# Patient Record
Sex: FEMALE | Race: BLACK OR AFRICAN AMERICAN
[De-identification: names, ages, dates, MRNs, and addresses within clinical notes are randomized per-mention and may not be internally consistent; named-entity substitution may affect disease eponyms.]

---

## 2021-04-03 ENCOUNTER — HOSPITAL ENCOUNTER (EMERGENCY)
Dept: HOSPITAL 8 - ED | Age: 26
Discharge: HOME | End: 2021-04-03
Payer: COMMERCIAL

## 2021-04-03 VITALS — DIASTOLIC BLOOD PRESSURE: 76 MMHG | SYSTOLIC BLOOD PRESSURE: 108 MMHG

## 2021-04-03 VITALS — HEIGHT: 61 IN | WEIGHT: 104.94 LBS | BODY MASS INDEX: 19.81 KG/M2

## 2021-04-03 DIAGNOSIS — U07.1: Primary | ICD-10-CM

## 2021-04-03 DIAGNOSIS — R10.9: ICD-10-CM

## 2021-04-03 DIAGNOSIS — R51.9: ICD-10-CM

## 2021-04-03 DIAGNOSIS — R11.0: ICD-10-CM

## 2021-04-03 DIAGNOSIS — M70.10: ICD-10-CM

## 2021-04-03 LAB
ALBUMIN SERPL-MCNC: 3.6 G/DL (ref 3.4–5)
ALP SERPL-CCNC: 53 U/L (ref 45–117)
ALT SERPL-CCNC: 17 U/L (ref 12–78)
ANION GAP SERPL CALC-SCNC: 3 MMOL/L (ref 5–15)
BASOPHILS # BLD AUTO: 0 X10^3/UL (ref 0–0.1)
BASOPHILS NFR BLD AUTO: 1 % (ref 0–1)
BILIRUB SERPL-MCNC: 0.2 MG/DL (ref 0.2–1)
CALCIUM SERPL-MCNC: 8.4 MG/DL (ref 8.5–10.1)
CHLORIDE SERPL-SCNC: 112 MMOL/L (ref 98–107)
CREAT SERPL-MCNC: 0.58 MG/DL (ref 0.55–1.02)
EOSINOPHIL # BLD AUTO: 0 X10^3/UL (ref 0–0.4)
EOSINOPHIL NFR BLD AUTO: 1 % (ref 1–7)
ERYTHROCYTE [DISTWIDTH] IN BLOOD BY AUTOMATED COUNT: 13.2 % (ref 9.6–15.2)
LYMPHOCYTES # BLD AUTO: 1.5 X10^3/UL (ref 1–3.4)
LYMPHOCYTES NFR BLD AUTO: 46 % (ref 22–44)
MCH RBC QN AUTO: 31.3 PG (ref 27–34.8)
MCHC RBC AUTO-ENTMCNC: 34.3 G/DL (ref 32.4–35.8)
MD: NO
MONOCYTES # BLD AUTO: 0.3 X10^3/UL (ref 0.2–0.8)
MONOCYTES NFR BLD AUTO: 9 % (ref 2–9)
NEUTROPHILS # BLD AUTO: 1.3 X10^3/UL (ref 1.8–6.8)
NEUTROPHILS NFR BLD AUTO: 42 % (ref 42–75)
PLATELET # BLD AUTO: 217 X10^3/UL (ref 130–400)
PMV BLD AUTO: 8.5 FL (ref 7.4–10.4)
PROT SERPL-MCNC: 7.6 G/DL (ref 6.4–8.2)
RBC # BLD AUTO: 4.52 X10^6/UL (ref 3.82–5.3)

## 2021-04-03 PROCEDURE — 80053 COMPREHEN METABOLIC PANEL: CPT

## 2021-04-03 PROCEDURE — 71045 X-RAY EXAM CHEST 1 VIEW: CPT

## 2021-04-03 PROCEDURE — 84703 CHORIONIC GONADOTROPIN ASSAY: CPT

## 2021-04-03 PROCEDURE — 36415 COLL VENOUS BLD VENIPUNCTURE: CPT

## 2021-04-03 PROCEDURE — 99284 EMERGENCY DEPT VISIT MOD MDM: CPT

## 2021-04-03 PROCEDURE — U0003 INFECTIOUS AGENT DETECTION BY NUCLEIC ACID (DNA OR RNA); SEVERE ACUTE RESPIRATORY SYNDROME CORONAVIRUS 2 (SARS-COV-2) (CORONAVIRUS DISEASE [COVID-19]), AMPLIFIED PROBE TECHNIQUE, MAKING USE OF HIGH THROUGHPUT TECHNOLOGIES AS DESCRIBED BY CMS-2020-01-R: HCPCS

## 2021-04-03 PROCEDURE — 85025 COMPLETE CBC W/AUTO DIFF WBC: CPT

## 2021-09-19 ENCOUNTER — HOSPITAL ENCOUNTER (EMERGENCY)
Dept: HOSPITAL 8 - ED | Age: 26
Discharge: HOME | End: 2021-09-19
Payer: COMMERCIAL

## 2021-09-19 VITALS — WEIGHT: 98.77 LBS | BODY MASS INDEX: 19.39 KG/M2 | HEIGHT: 60 IN

## 2021-09-19 VITALS — SYSTOLIC BLOOD PRESSURE: 102 MMHG | DIASTOLIC BLOOD PRESSURE: 68 MMHG

## 2021-09-19 DIAGNOSIS — K64.4: Primary | ICD-10-CM

## 2021-09-19 PROCEDURE — 99283 EMERGENCY DEPT VISIT LOW MDM: CPT

## 2021-09-19 NOTE — NUR
PATIENT CLEARED FOR DISCHARGE. VERBALIZDED UNDERSTANDING OF SELF CARE AND 
FOLLOW UP CARE AT HOME. AMBULATORY TO DISCHARGE DESK WITHOUT COMPLICATIONS WITH 
BELONGINGS.

## 2023-07-25 ENCOUNTER — OFFICE VISIT (OUTPATIENT)
Dept: URGENT CARE | Facility: CLINIC | Age: 28
End: 2023-07-25
Payer: COMMERCIAL

## 2023-07-25 ENCOUNTER — HOSPITAL ENCOUNTER (OUTPATIENT)
Facility: MEDICAL CENTER | Age: 28
End: 2023-07-25
Attending: NURSE PRACTITIONER
Payer: COMMERCIAL

## 2023-07-25 VITALS
HEART RATE: 71 BPM | HEIGHT: 60 IN | DIASTOLIC BLOOD PRESSURE: 62 MMHG | BODY MASS INDEX: 21.6 KG/M2 | WEIGHT: 110 LBS | OXYGEN SATURATION: 98 % | SYSTOLIC BLOOD PRESSURE: 94 MMHG | TEMPERATURE: 98.3 F | RESPIRATION RATE: 16 BRPM

## 2023-07-25 DIAGNOSIS — Z11.3 SCREEN FOR STD (SEXUALLY TRANSMITTED DISEASE): ICD-10-CM

## 2023-07-25 DIAGNOSIS — Z20.2 EXPOSURE TO SYPHILIS: ICD-10-CM

## 2023-07-25 PROCEDURE — 87491 CHLMYD TRACH DNA AMP PROBE: CPT

## 2023-07-25 PROCEDURE — 3074F SYST BP LT 130 MM HG: CPT | Performed by: NURSE PRACTITIONER

## 2023-07-25 PROCEDURE — 87591 N.GONORRHOEAE DNA AMP PROB: CPT

## 2023-07-25 PROCEDURE — 3078F DIAST BP <80 MM HG: CPT | Performed by: NURSE PRACTITIONER

## 2023-07-25 PROCEDURE — 99203 OFFICE O/P NEW LOW 30 MIN: CPT | Performed by: NURSE PRACTITIONER

## 2023-07-25 RX ORDER — HYDROXYZINE PAMOATE 25 MG/1
CAPSULE ORAL
COMMUNITY
Start: 2023-06-24

## 2023-07-25 RX ORDER — CITALOPRAM 20 MG/1
20 TABLET ORAL
COMMUNITY
Start: 2023-07-14

## 2023-07-25 RX ORDER — ARIPIPRAZOLE 2 MG/1
4 TABLET ORAL
COMMUNITY
Start: 2023-07-14

## 2023-07-25 ASSESSMENT — ENCOUNTER SYMPTOMS
FEVER: 0
HEADACHES: 0

## 2023-07-26 LAB
C TRACH DNA GENITAL QL NAA+PROBE: NEGATIVE
N GONORRHOEA DNA GENITAL QL NAA+PROBE: NEGATIVE
SPECIMEN SOURCE: NORMAL

## 2023-07-26 NOTE — PROGRESS NOTES
Subjective:   Manny Proctor is a 27 y.o. female who presents for Exposure to STD      Exposure to STD  This is a new problem. The current episode started in the past 7 days (Possible exposure to syphilis). The problem occurs constantly. Pertinent negatives include no fever, headaches, rash or urinary symptoms.       Review of Systems   Constitutional:  Negative for fever.   Genitourinary:  Negative for dysuria, frequency and urgency.   Skin:  Negative for rash.   Neurological:  Negative for headaches.       Medications:    ARIPiprazole  citalopram Tabs  hydrOXYzine pamoate Caps    Allergies: Patient has no known allergies.    Problem List: Manny Proctor does not have any pertinent problems on file.    Surgical History:  No past surgical history on file.    Past Social Hx: Manny Proctor  reports that she has never smoked. She has never used smokeless tobacco. She reports current drug use. Drug: Marijuana. She reports that she does not drink alcohol.     Past Family Hx:  Manny Proctor family history includes Alcohol/Drug in her paternal grandfather; Arthritis in her mother; Cancer in her maternal grandmother; Diabetes in her paternal aunt, paternal grandmother, and paternal uncle.     Problem list, medications, and allergies reviewed by myself today in Epic.     Objective:     BP 94/62 (BP Location: Left arm, Patient Position: Sitting, BP Cuff Size: Adult)   Pulse 71   Temp 36.8 °C (98.3 °F) (Temporal)   Resp 16   Ht 1.524 m (5')   Wt 49.9 kg (110 lb)   SpO2 98%   BMI 21.48 kg/m²     Physical Exam  Constitutional:       Appearance: Normal appearance. She is not ill-appearing or toxic-appearing.   HENT:      Head: Normocephalic.      Right Ear: External ear normal.      Left Ear: External ear normal.      Nose: Nose normal.      Mouth/Throat:      Lips: Pink.   Eyes:      General: Lids are normal.   Pulmonary:      Effort: Pulmonary effort is normal. No accessory muscle usage.    Musculoskeletal:      Cervical back: Full passive range of motion without pain.   Neurological:      Mental Status: She is alert and oriented to person, place, and time.   Psychiatric:         Mood and Affect: Mood normal.         Thought Content: Thought content normal.         Assessment/Plan:     Diagnosis and associated orders:     1. Exposure to syphilis  Chlamydia/GC, PCR (Genital/Anal swab)    T.PALLIDUM AB AGATHA (SCREENING)    HIV AG/AB COMBO ASSAY SCREENING      2. Screen for STD (sexually transmitted disease)             Comments/MDM:     I personally reviewed prior external notes and prior test results pertinent to today's visit.  Patient with a possible exposure to syphilis she is requesting full STI panel.  Advised patient if syphilis comes back positive she will be directed to the health department for treatment.  She will make an account register on [employer] for results.  We will follow-up if further treatment is necessary.  Discussed management options, risks and benefits, and alternatives to treatment plan agreed upon.   Red flags discussed and indications to immediately call 911 or present to the Emergency Department.   Supportive care, differential diagnoses, and indications for immediate follow-up discussed with patient.    Patient expresses understanding and agrees to plan. Patient denies any other questions or concerns.                  Please note that this dictation was created using voice recognition software. I have made a reasonable attempt to correct obvious errors, but I expect that there are errors of grammar and possibly content that I did not discover before finalizing the note.    This note was electronically signed by Edison BECKFORD.

## 2023-09-02 ENCOUNTER — OFFICE VISIT (OUTPATIENT)
Dept: URGENT CARE | Facility: PHYSICIAN GROUP | Age: 28
End: 2023-09-02
Payer: COMMERCIAL

## 2023-09-02 ENCOUNTER — APPOINTMENT (OUTPATIENT)
Dept: RADIOLOGY | Facility: IMAGING CENTER | Age: 28
End: 2023-09-02
Attending: PHYSICIAN ASSISTANT
Payer: COMMERCIAL

## 2023-09-02 VITALS
OXYGEN SATURATION: 100 % | DIASTOLIC BLOOD PRESSURE: 82 MMHG | RESPIRATION RATE: 12 BRPM | TEMPERATURE: 98.6 F | WEIGHT: 120 LBS | BODY MASS INDEX: 22.66 KG/M2 | SYSTOLIC BLOOD PRESSURE: 118 MMHG | HEART RATE: 69 BPM | HEIGHT: 61 IN

## 2023-09-02 DIAGNOSIS — K59.00 CONSTIPATION, UNSPECIFIED CONSTIPATION TYPE: ICD-10-CM

## 2023-09-02 DIAGNOSIS — R10.13 EPIGASTRIC PAIN: ICD-10-CM

## 2023-09-02 PROCEDURE — 3074F SYST BP LT 130 MM HG: CPT | Performed by: PHYSICIAN ASSISTANT

## 2023-09-02 PROCEDURE — 99213 OFFICE O/P EST LOW 20 MIN: CPT | Performed by: PHYSICIAN ASSISTANT

## 2023-09-02 PROCEDURE — 3079F DIAST BP 80-89 MM HG: CPT | Performed by: PHYSICIAN ASSISTANT

## 2023-09-02 PROCEDURE — 74018 RADEX ABDOMEN 1 VIEW: CPT | Mod: TC | Performed by: PHYSICIAN ASSISTANT

## 2023-09-02 ASSESSMENT — ENCOUNTER SYMPTOMS
COUGH: 0
FEVER: 0
SHORTNESS OF BREATH: 0
NAUSEA: 0
VOMITING: 0
DIARRHEA: 0
DIAPHORESIS: 0
DIZZINESS: 0
ABDOMINAL PAIN: 1
WHEEZING: 0
HEADACHES: 0
CONSTIPATION: 1
CHILLS: 0

## 2023-09-02 NOTE — LETTER
[facility] [place]  [address]  [place] [id number]     September 2, 2023    Patient: Manny Proctor   YOB: 1995   Date of Visit: 9/2/2023       To Whom It May Concern:    Manny Proctor was seen and treated in our department on 9/2/2023.  Please excuse from work on 9/1/2023 and 9/2/2023    Sincerely,     Mitul Deluna P.A.-C.

## 2023-09-02 NOTE — PROGRESS NOTES
"  Subjective:     Manny Proctor  is a 27 y.o. female who presents for Constipation (Pain in sternum and constipated )       She presents today with epigastric pain has been ongoing for the last 2 weeks.  Denies any recent dietary changes.  Denies any excessive alcohol intake, no anti-inflammatory use.  No nausea or vomiting.  She does note constipation that has been ongoing over the past several months, she has been using laxative medications without longstanding relief.  She denies any fevers, no chest pain or shortness of breath.  She has been experiencing blood in her stools but this blood is bright red and typically on the toilet paper, does have history of hemorrhoids.     Review of Systems   Constitutional:  Negative for chills, diaphoresis, fever and malaise/fatigue.   Respiratory:  Negative for cough, shortness of breath and wheezing.    Cardiovascular:  Negative for chest pain.   Gastrointestinal:  Positive for abdominal pain and constipation. Negative for diarrhea, nausea and vomiting.   Genitourinary:  Negative for dysuria, frequency and urgency.   Neurological:  Negative for dizziness and headaches.      No Known Allergies  Past Medical History:   Diagnosis Date    Acne vulgaris 5/24/2016        Objective:   /82 (BP Location: Right arm, Patient Position: Sitting, BP Cuff Size: Adult)   Pulse 69   Temp 37 °C (98.6 °F) (Temporal)   Resp 12   Ht 1.549 m (5' 1\")   Wt 54.4 kg (120 lb)   SpO2 100%   BMI 22.67 kg/m²   Physical Exam  Vitals and nursing note reviewed.   Constitutional:       General: She is not in acute distress.     Appearance: She is not ill-appearing or toxic-appearing.   HENT:      Head: Normocephalic.      Nose: No rhinorrhea.   Eyes:      General: No scleral icterus.     Conjunctiva/sclera: Conjunctivae normal.   Pulmonary:      Effort: Pulmonary effort is normal. No respiratory distress.      Breath sounds: No stridor.   Abdominal:      General: Abdomen is flat. Bowel " sounds are normal. There is no distension.      Palpations: Abdomen is soft.      Tenderness: There is abdominal tenderness (Over epigastric region). There is no guarding or rebound.   Musculoskeletal:      Cervical back: Neck supple.   Neurological:      Mental Status: She is alert and oriented to person, place, and time.   Psychiatric:         Mood and Affect: Mood normal.         Behavior: Behavior normal.         Thought Content: Thought content normal.         Judgment: Judgment normal.           Diagnostic testing:    KUB  Radiologist IMPRESSION:  Mild to moderate stool in the right-sided colon. Nonobstructive bowel gas pattern.    Assessment/Plan:     Encounter Diagnoses   Name Primary?    Epigastric pain     Constipation, unspecified constipation type           Plan for care for today's complaint includes obtaining a KUB for evaluation of abdominal pain and constipation, did show moderate stool present and nonobstructive gas pattern.  Did instruct patient to begin using MiraLAX powder for her constipation.  Discussed with the patient that her epigastric pain is likely related to her constipation.  Increase fluid intake to assist with constipation.  Continue to monitor symptoms and return to urgent care or follow-up with primary care provider if symptoms remain ongoing.  Follow-up in the emergency department if symptoms become severe, ER precautions discussed in office today..    See AVS Instructions below for written guidance provided to patient on after-visit management and care in addition to our verbal discussion during the visit.    Please note that this dictation was created using voice recognition software. I have attempted to correct all errors, but there may be sound-alike, spelling, grammar and possibly content errors that I did not discover before finalizing the note.    Mitul Deluna PA-C